# Patient Record
Sex: MALE
[De-identification: names, ages, dates, MRNs, and addresses within clinical notes are randomized per-mention and may not be internally consistent; named-entity substitution may affect disease eponyms.]

---

## 2020-09-27 ENCOUNTER — NURSE TRIAGE (OUTPATIENT)
Dept: OTHER | Facility: CLINIC | Age: 45
End: 2020-09-27

## 2020-09-27 NOTE — TELEPHONE ENCOUNTER
Reason for Disposition   [1] Caller has NON-URGENT medication question about med that PCP prescribed AND [2] triager unable to answer question    Answer Assessment - Initial Assessment Questions  1. NAME of MEDICATION: \"What medicine are you calling about? \"      metformin  2. QUESTION: Carleen Mainland is your question? \"      Wants to know side effects  3. PRESCRIBING HCP: \"Who prescribed it? \" Reason: if prescribed by specialist, call should be referred to that group. 4. SYMPTOMS: \"Do you have any symptoms? \"     dizzy  5. SEVERITY: If symptoms are present, ask \"Are they mild, moderate or severe? \"      mild  6. PREGNANCY:  \"Is there any chance that you are pregnant? \" \"When was your last menstrual period? \"     na    Protocols used: MEDICATION QUESTION CALL-ADULT-    Pt called concerned about dizzy spells, sts intermittent, thinks due to medications. ..per protocol, pt to follow up with provider . . pt to call in am

## 2021-05-29 ENCOUNTER — NURSE TRIAGE (OUTPATIENT)
Dept: OTHER | Facility: CLINIC | Age: 46
End: 2021-05-29

## 2021-05-29 NOTE — TELEPHONE ENCOUNTER
no  7. CAUSE: \"What do you think is causing the swelling? \"  Unsure    8. RECURRENT SYMPTOM: \"Have you had eyelid swelling before? \" If so, ask: \"When was the last time? \" \"What happened that time? \"   no    9. OTHER SYMPTOMS: \"Do you have any other symptoms? \" (e.g., blurred vision, eye discharge, rash, runny nose)  mildly red bottom lid and puffy bag under eye    10. PREGNANCY: \"Is there any chance you are pregnant? \" \"When was your last menstrual period? \" n/a        n/a    Protocols used: [facility]

## 2021-07-08 ENCOUNTER — NURSE TRIAGE (OUTPATIENT)
Dept: OTHER | Facility: CLINIC | Age: 46
End: 2021-07-08

## 2021-07-08 NOTE — TELEPHONE ENCOUNTER
Brief description of call: patient spoke to his MD this morning regarding a cyst under his eye that has not responded to treatment. Patient's MD recommended that he see an eye doctor but patient wanted to get a second opinion. Advised patient to follow the instructions of his MD and provided him with information on eye doctors and facilities covered by his plan. This call is a result of a call to [address] [place] [address]. Please do not respond to the triage nurse through this encounter. Any subsequent communication should be directly with the patient. Reason for Disposition  [facility] Caller has already spoken with another triager or PCP AND has further questions AND triager able to answer questions.     Protocols used: NO CONTACT OR DUPLICATE CONTACT CALL-ADULT-